# Patient Record
Sex: FEMALE | Race: WHITE
[De-identification: names, ages, dates, MRNs, and addresses within clinical notes are randomized per-mention and may not be internally consistent; named-entity substitution may affect disease eponyms.]

---

## 2018-01-20 ENCOUNTER — HOSPITAL ENCOUNTER (EMERGENCY)
Dept: HOSPITAL 91 - FTE | Age: 23
Discharge: HOME | End: 2018-01-20
Payer: COMMERCIAL

## 2018-01-20 ENCOUNTER — HOSPITAL ENCOUNTER (EMERGENCY)
Age: 23
Discharge: HOME | End: 2018-01-20

## 2018-01-20 DIAGNOSIS — R10.2: ICD-10-CM

## 2018-01-20 DIAGNOSIS — N93.8: Primary | ICD-10-CM

## 2018-01-20 LAB
ADD MAN DIFF?: NO
ADD UMIC: YES
ALANINE AMINOTRANSFERASE: 38 IU/L (ref 13–69)
ALBUMIN/GLOBULIN RATIO: 1.18
ALBUMIN: 4.4 G/DL (ref 3.3–4.9)
ALKALINE PHOSPHATASE: 66 IU/L (ref 42–121)
ANION GAP: 16 (ref 8–16)
ASPARTATE AMINO TRANSFERASE: 26 IU/L (ref 15–46)
BASOPHIL #: 0.1 10^3/UL (ref 0–0.1)
BASOPHILS %: 0.5 % (ref 0–2)
BILIRUBIN,DIRECT: 0 MG/DL (ref 0–0.2)
BILIRUBIN,TOTAL: 0 MG/DL (ref 0.2–1.3)
BLOOD UREA NITROGEN: 13 MG/DL (ref 7–20)
CALCIUM: 9.2 MG/DL (ref 8.4–10.2)
CARBON DIOXIDE: 26 MMOL/L (ref 21–31)
CHLORIDE: 104 MMOL/L (ref 97–110)
CREATININE: 0.69 MG/DL (ref 0.44–1)
EOSINOPHILS #: 0 10^3/UL (ref 0–0.5)
EOSINOPHILS %: 0.3 % (ref 0–7)
GLOBULIN: 3.7 G/DL (ref 1.3–3.2)
GLUCOSE: 98 MG/DL (ref 70–220)
HEMATOCRIT: 31.9 % (ref 37–47)
HEMOGLOBIN: 10.4 G/DL (ref 12–16)
LYMPHOCYTES #: 3.3 10^3/UL (ref 0.8–2.9)
LYMPHOCYTES %: 34.9 % (ref 15–51)
MEAN CORPUSCULAR HEMOGLOBIN: 26.3 PG (ref 29–33)
MEAN CORPUSCULAR HGB CONC: 32.6 G/DL (ref 32–37)
MEAN CORPUSCULAR VOLUME: 80.8 FL (ref 82–101)
MEAN PLATELET VOLUME: 9.1 FL (ref 7.4–10.4)
MONOCYTE #: 0.6 10^3/UL (ref 0.3–0.9)
MONOCYTES %: 6.2 % (ref 0–11)
NEUTROPHIL #: 5.5 10^3/UL (ref 1.6–7.5)
NEUTROPHILS %: 57.8 % (ref 39–77)
NUCLEATED RED BLOOD CELLS #: 0 10^3/UL (ref 0–0)
NUCLEATED RED BLOOD CELLS%: 0 /100WBC (ref 0–0)
PLATELET COUNT: 377 10^3/UL (ref 140–415)
POTASSIUM: 3.9 MMOL/L (ref 3.5–5.1)
RED BLOOD COUNT: 3.95 10^6/UL (ref 4.2–5.4)
RED CELL DISTRIBUTION WIDTH: 15.4 % (ref 11.5–14.5)
SODIUM: 142 MMOL/L (ref 135–144)
TOTAL PROTEIN: 8.1 G/DL (ref 6.1–8.1)
UR ASCORBIC ACID: NEGATIVE MG/DL
UR BILIRUBIN (DIP): NEGATIVE MG/DL
UR BLOOD (DIP): (no result) MG/DL
UR CLARITY: (no result)
UR COLOR: (no result)
UR GLUCOSE (DIP): NEGATIVE MG/DL
UR KETONES (DIP): NEGATIVE MG/DL
UR LEUKOCYTE ESTERASE (DIP): (no result) LEU/UL
UR NITRITE (DIP): NEGATIVE MG/DL
UR PH (DIP): 6 (ref 5–9)
UR RBC: > 182 /HPF (ref 0–5)
UR SPECIFIC GRAVITY (DIP): 1.02 (ref 1–1.03)
UR TOTAL PROTEIN (DIP): (no result) MG/DL
UR UROBILINOGEN (DIP): NEGATIVE MG/DL
UR WBC: > 182 /HPF (ref 0–5)
WHITE BLOOD COUNT: 9.5 10^3/UL (ref 4.8–10.8)

## 2018-01-20 PROCEDURE — 80053 COMPREHEN METABOLIC PANEL: CPT

## 2018-01-20 PROCEDURE — 36415 COLL VENOUS BLD VENIPUNCTURE: CPT

## 2018-01-20 PROCEDURE — 81001 URINALYSIS AUTO W/SCOPE: CPT

## 2018-01-20 PROCEDURE — 85025 COMPLETE CBC W/AUTO DIFF WBC: CPT

## 2018-01-20 PROCEDURE — 84702 CHORIONIC GONADOTROPIN TEST: CPT

## 2018-01-20 PROCEDURE — 76856 US EXAM PELVIC COMPLETE: CPT

## 2018-01-20 PROCEDURE — 99285 EMERGENCY DEPT VISIT HI MDM: CPT

## 2018-01-20 RX ADMIN — THIAMINE HYDROCHLORIDE 1 MLS/HR: 100 INJECTION, SOLUTION INTRAMUSCULAR; INTRAVENOUS at 13:33

## 2019-06-15 ENCOUNTER — HOSPITAL ENCOUNTER (EMERGENCY)
Dept: HOSPITAL 91 - FTE | Age: 24
Discharge: HOME | End: 2019-06-15
Payer: COMMERCIAL

## 2019-06-15 ENCOUNTER — HOSPITAL ENCOUNTER (EMERGENCY)
Dept: HOSPITAL 10 - FTE | Age: 24
Discharge: HOME | End: 2019-06-15
Payer: COMMERCIAL

## 2019-06-15 VITALS — HEART RATE: 92 BPM | DIASTOLIC BLOOD PRESSURE: 92 MMHG | RESPIRATION RATE: 18 BRPM | SYSTOLIC BLOOD PRESSURE: 146 MMHG

## 2019-06-15 VITALS
HEIGHT: 63 IN | BODY MASS INDEX: 37.11 KG/M2 | BODY MASS INDEX: 37.11 KG/M2 | WEIGHT: 209.44 LBS | WEIGHT: 209.44 LBS | HEIGHT: 63 IN

## 2019-06-15 DIAGNOSIS — N93.9: Primary | ICD-10-CM

## 2019-06-15 LAB
ADD MAN DIFF?: NO
ADD UMIC: YES
BASOPHIL #: 0.1 10^3/UL (ref 0–0.1)
BASOPHILS %: 0.5 % (ref 0–2)
EOSINOPHILS #: 0 10^3/UL (ref 0–0.5)
EOSINOPHILS %: 0.4 % (ref 0–7)
HEMATOCRIT: 27.9 % (ref 37–47)
HEMOGLOBIN: 8.6 G/DL (ref 12–16)
IMMATURE GRANS #M: 0.03 10^3/UL (ref 0–0.03)
IMMATURE GRANS % (M): 0.3 % (ref 0–0.43)
LYMPHOCYTES #: 2.9 10^3/UL (ref 0.8–2.9)
LYMPHOCYTES %: 30.6 % (ref 15–51)
MEAN CORPUSCULAR HEMOGLOBIN: 22.9 PG (ref 29–33)
MEAN CORPUSCULAR HGB CONC: 30.8 G/DL (ref 32–37)
MEAN CORPUSCULAR VOLUME: 74.2 FL (ref 82–101)
MEAN PLATELET VOLUME: 8.5 FL (ref 7.4–10.4)
MONOCYTE #: 0.6 10^3/UL (ref 0.3–0.9)
MONOCYTES %: 6.3 % (ref 0–11)
NEUTROPHIL #: 5.8 10^3/UL (ref 1.6–7.5)
NEUTROPHILS %: 61.9 % (ref 39–77)
NUCLEATED RED BLOOD CELLS #: 0 10^3/UL (ref 0–0)
NUCLEATED RED BLOOD CELLS%: 0 /100WBC (ref 0–0)
PLATELET COUNT: 413 10^3/UL (ref 140–415)
RED BLOOD COUNT: 3.76 10^6/UL (ref 4.2–5.4)
RED CELL DISTRIBUTION WIDTH: 16.9 % (ref 11.5–14.5)
UR ASCORBIC ACID: NEGATIVE MG/DL
UR BILIRUBIN (DIP): NEGATIVE MG/DL
UR BLOOD (DIP): (no result) MG/DL
UR CLARITY: (no result)
UR COLOR: (no result)
UR GLUCOSE (DIP): NEGATIVE MG/DL
UR KETONES (DIP): NEGATIVE MG/DL
UR LEUKOCYTE ESTERASE (DIP): NEGATIVE LEU/UL
UR NITRITE (DIP): NEGATIVE MG/DL
UR RBC: > 182 /HPF (ref 0–5)
UR TOTAL PROTEIN (DIP): (no result) MG/DL
UR UROBILINOGEN (DIP): NEGATIVE MG/DL
UR WBC: 31 /HPF (ref 0–5)
URINE PH (DIP): 7 (ref 5–9)
URINE SPECIFIC GRAVITY (DIP): 1 (ref 1–1.03)
WHITE BLOOD COUNT: 9.5 10^3/UL (ref 4.8–10.8)

## 2019-06-15 PROCEDURE — 81025 URINE PREGNANCY TEST: CPT

## 2019-06-15 PROCEDURE — 76856 US EXAM PELVIC COMPLETE: CPT

## 2019-06-15 PROCEDURE — 99284 EMERGENCY DEPT VISIT MOD MDM: CPT

## 2019-06-15 PROCEDURE — 84702 CHORIONIC GONADOTROPIN TEST: CPT

## 2019-06-15 PROCEDURE — 36415 COLL VENOUS BLD VENIPUNCTURE: CPT

## 2019-06-15 PROCEDURE — 85025 COMPLETE CBC W/AUTO DIFF WBC: CPT

## 2019-06-15 PROCEDURE — 81001 URINALYSIS AUTO W/SCOPE: CPT

## 2019-06-15 NOTE — ERD
ER Documentation


Chief Complaint


Chief Complaint





vag bleed x 5 days with clots and cramps





HPI


24-year-old past medical history of murmur who presents for vaginal bleeding x5 


days.  She states that she currently on her menstrual cycle and states that the 


bleeding is a little bit heavier than normal.  She states she is noted about 5 


pads or more a day.  She has seen her OB/GYN for similar symptoms and was given 


birth control pills however states that she stopped the medication due to weight


gain.  She was also put on Provera recently and when she completed the 


medication she states that the bleeding has been a little bit heavier than 


normal.  She denies any dizziness, chest pain, shortness of breath.  Denies 


nausea vomiting.  Denies pelvic pain.  No other modifying factors noted, no 


treatment tried at home.





ROS


All systems reviewed and are negative except as per history of present illness.





Medications


Home Meds


Active Scripts


Ferrous Sulfate* (Ferrous Sulfate*) 325 Mg Tabec, 325 MG PO DAILY for anemia, 


#30 TAB


   Prov:ISELA RAHMAN DO         6/15/19


Docusate Sodium* (Colace*) 100 Mg Capsule, 100 MG PO TID, #30 CAP


   Prov:JESUS ALBERTO STONE PA-C         1/20/18


Ferrous Sulfate* (Ferrous Sulfate*) 325 Mg Tabec, 325 MG PO BID, #60 TAB


   Prov:JESUS ALBERTO STONE PA-C         1/20/18


Noreth A-Et Estra/Fe Fumarate (LO LOESTRIN FE 1-10 TABLET) 1 Each Tablet, 1 EACH


PO DAILY, #1 PACKET


   Prov:JESUS ALBERTO STONE PA-C         1/20/18





PMhx/Soc


Murmur, being followed by cardiology


Hx Alcohol Use:  No


Hx Substance Use:  No


Hx Tobacco Use:  No


Smoking Status:  Never smoker





FmHx


Family History:  No coronary disease





Physical Exam


Vitals





Vital Signs


  Date      Temp  Pulse  Resp  B/P (MAP)   Pulse Ox  O2          O2 Flow    FiO2


Time                                                 Delivery    Rate


   6/15/19  98.1     92    18      146/92       100


     14:22                          (110)





Physical Exam


Const:   No acute distress


Resp:   Clear to auscultation bilaterally


Cardio:   Regular rate and rhythm, no murmurs


Abd:    Soft, non tender, non distended. Normal bowel sounds


Skin:   No petechiae or rashes


Back:   No midline or flank tenderness


Ext:    No cyanosis, or edema


Neur:   Awake and alert


Psych:    Normal Mood and Affect


Result Diagram:  


6/15/19 1508





Results 24 hrs





Laboratory Tests


      Test
                                  6/15/19
15:08  6/15/19
15:13


      White Blood Count                       9.5 10^3/ul


      Red Blood Count                        3.76 10^6/ul


      Hemoglobin                                 8.6 g/dl


      Hematocrit                                   27.9 %


      Mean Corpuscular Volume                     74.2 fl


      Mean Corpuscular Hemoglobin                 22.9 pg


      Mean Corpuscular Hemoglobin
Concent      30.8 g/dl 
  



      Red Cell Distribution Width                  16.9 %


      Platelet Count                          413 10^3/UL


      Mean Platelet Volume                         8.5 fl


      Immature Granulocytes %                     0.300 %


      Neutrophils %                                61.9 %


      Lymphocytes %                                30.6 %


      Monocytes %                                   6.3 %


      Eosinophils %                                 0.4 %


      Basophils %                                   0.5 %


      Nucleated Red Blood Cells %             0.0 /100WBC


      Immature Granulocytes #               0.030 10^3/ul


      Neutrophils #                           5.8 10^3/ul


      Lymphocytes #                           2.9 10^3/ul


      Monocytes #                             0.6 10^3/ul


      Eosinophils #                           0.0 10^3/ul


      Basophils #                             0.1 10^3/ul


      Nucleated Red Blood Cells #             0.0 10^3/ul


      Urine Color                          RED


      Urine Clarity                        BLOODY


      Urine pH                                        7.0


      Urine Specific Gravity                        1.005


      Urine Ketones                        NEGATIVE mg/dL


      Urine Nitrite                        NEGATIVE mg/dL


      Urine Bilirubin                      NEGATIVE mg/dL


      Urine Urobilinogen                   NEGATIVE mg/dL


      Urine Leukocyte Esterase
            NEGATIVE
Sandy/ul  



      Urine Microscopic RBC                > 182 /HPF


      Urine Microscopic WBC                       31 /HPF


      Urine Hemoglobin                           3+ mg/dL


      Urine Glucose                        NEGATIVE mg/dL


      Urine Total Protein                        3+ mg/dl


      Beta HCG, Quantitative               < 2.4 mIU/ml


      POC Beta HCG, Qualitative                             NEGATIVE








Procedures/MDM


Medical Decision Making:





Differential diagnosis includes but not limited to menorrhagia, ovarian cyst, 


uterine fibroid, pregnancy.





Patient appeared well on physical examination.





CBC showed no elevated WBC to suggest systemic infection, hemoglobin 8.6


UA did not indicate infection


Urine pregnancy test was negative


Pelvic ultrasound noted to be unremarkable, there is no uterine fibroid or cyst 


noted





Patient likely has menorrhagia due to hormonal history lesion.  Patient advised 


to follow with OB/GYN.


She states that she has a PCP appointment in 2 days.  She is advised to recheck 


her hemoglobin level as well as repeat pelvic ultrasound.





Given prescription for iron for the anemia.





Patient advised to follow up with PCP in 1-2 days. Patient advised to return to 


ED for new or worsening symptoms. Patient stable on discharge from the ED.











Disclaimer: Inadvertent spelling and grammatical errors are likely due to 


EHR/dictation software use and do not reflect on the overall quality of patient 


care. Also, please note that the electronic time recorded on this note does not 


necessarily reflect the actual time of the patient encounter.





Departure


Diagnosis:  


   Primary Impression:  


   Excessive vaginal bleeding


Condition:  Fair


Patient Instructions:  Menorrhagia


Referrals:  


Formerly [facility], [facility]–1983


YOU HAVE RECEIVED A MEDICAL SCREENING EXAM AND THE RESULTS INDICATE THAT YOU DO 


NOT HAVE A CONDITION THAT REQUIRES URGENT TREATMENT IN THE EMERGENCY DEPARTMENT.





FURTHER EVALUATION AND TREATMENT OF YOUR CONDITION CAN WAIT UNTIL YOU ARE SEEN 


IN YOUR DOCTORS OFFICE WITHIN THE NEXT 1-2 DAYS. IT IS YOUR RESPONSIBILITY TO 


MAKE AN APPOINTMENT FOR FOLOW-UP CARE.





IF YOU HAVE A PRIMARY DOCTOR


--you should call your primary doctor and schedule an appointment





IF YOU DO NOT HAVE A PRIMARY DOCTOR YOU CAN CALL OUR PHYSICIAN REFERRAL HOTLINE 


AT


 [phone] 





IF YOU CAN NOT AFFORD TO SEE A PHYSICIAN YOU CAN CHOSE FROM THE FOLLOWING 


[facility] [phone] [address] [facility]. [facility] [phone] [address] [facility]. [facility] [phone] [address] [facility] [phone] [address] [facility]. [facility] [phone] [address] [facility]. [facility]. [phone]


[address]





Additional Instructions:  


Llame al doctor MAANA y marva chely NORMA PARA DENTRO DE 1-2 BRAMBILA.Dgale a la 


secretaria que nosotros le instruimos hacer esta norma.Avise o llame si garcia 


condicin se empeora antes de la norma. Regresa aqui si peor o no mejor.





Call your primary care doctor TOMORROW for an appointment during the next 1-2 


days.See the doctor sooner or return here if your condition worsens before your 


appointment time.











ISELA RAHMAN DO                 Sushil 15, 2019 16:22